# Patient Record
Sex: FEMALE | Race: WHITE | NOT HISPANIC OR LATINO | Employment: UNEMPLOYED | URBAN - METROPOLITAN AREA
[De-identification: names, ages, dates, MRNs, and addresses within clinical notes are randomized per-mention and may not be internally consistent; named-entity substitution may affect disease eponyms.]

---

## 2017-01-04 NOTE — PATIENT DISCUSSION
AMD (DRY), OU:  PRESCRIBE AREDS 2 VITAMINS / AMSLER GRID QD/ UV PROTECTION. SMOKING CESSATION EMPHASIZED. RETURN FOR FOLLOW-UP AS SCHEDULED.

## 2017-01-04 NOTE — PATIENT DISCUSSION
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   RETURN FOR FOLLOW-UP AS SCHEDULED FOR DILATED EYE EXAM.

## 2017-01-04 NOTE — PATIENT DISCUSSION
NONPROLIFERATIVE DIABETIC RETINOPATHY OS: NO EDEMA OR LARGE HEME'S. CONTINUE TO MONITOR WITH DILATED EYE EXAMS.

## 2017-05-04 NOTE — PATIENT DISCUSSION
EARLY MILD NON-EXUDATIVE DRY AMD OU:  NOT NECESSARY FOR PATIENT TO TAKE AREDS 2 VITAMINS AT THIS TIME. RECOMMEND HOME MONITORING OF VISION WITH AMSLER GRID AND USE OF UV PROTECTION. SMOKING AVOIDANCE REVIEWED. RETURN FOR FOLLOW-UP AS SCHEDULED.

## 2017-11-16 NOTE — PATIENT DISCUSSION
NONPROLIFERATIVE DIABETIC RETINOPATHY OS: STABLE - RETURN FOR FOLLOW-UP AS SCHEDULED FOR DILATED EYE EXAM.

## 2022-02-01 ENCOUNTER — NEW PATIENT (OUTPATIENT)
Dept: URBAN - METROPOLITAN AREA CLINIC 19 | Facility: CLINIC | Age: 42
End: 2022-02-01

## 2022-02-01 DIAGNOSIS — H18.593: ICD-10-CM

## 2022-02-01 DIAGNOSIS — H52.223: ICD-10-CM

## 2022-02-01 PROCEDURE — 92015 DETERMINE REFRACTIVE STATE: CPT

## 2022-02-01 PROCEDURE — 92004 COMPRE OPH EXAM NEW PT 1/>: CPT

## 2022-02-01 ASSESSMENT — KERATOMETRY
OD_AXISANGLE2_DEGREES: 173
OS_AXISANGLE_DEGREES: 67
OD_K1POWER_DIOPTERS: 44.50
OS_K1POWER_DIOPTERS: 44.50
OD_K2POWER_DIOPTERS: 45.50
OD_AXISANGLE_DEGREES: 83
OS_K2POWER_DIOPTERS: 45.50
OS_AXISANGLE2_DEGREES: 157

## 2022-02-01 ASSESSMENT — TONOMETRY
OS_IOP_MMHG: 18
OD_IOP_MMHG: 20

## 2022-02-01 ASSESSMENT — VISUAL ACUITY
OU_SC: 20/20
OD_SC: 20/40-1
OS_SC: 20/25-2

## 2022-04-19 NOTE — PROCEDURE NOTE: CLINICAL
PROCEDURE NOTE: Excision of Eyelid Lesion, Defect Size Small; Simple Closure or Cautery #1 Right Lower Lid. After informed consent the lesion was anesthetized with local anesthetic, 1% lidocane with epinephrine 1:100,000 units. Sterile technique was used to remove the lesion with Adair scissors. Antibiotic ointment was used to treat the area where lesion was removed. Lesion was sent to pathology for analysis. The patient was given written post operative wound care instructions and a prescription for antibiotic ointment. The patient was asked to call  within 10 days if they had not been otherwise called by our office with the result of the biopsy. Klaus Rueda

## 2024-02-21 ENCOUNTER — ESTABLISHED PATIENT (OUTPATIENT)
Dept: URBAN - METROPOLITAN AREA CLINIC 19 | Facility: CLINIC | Age: 44
End: 2024-02-21

## 2024-02-21 DIAGNOSIS — H52.223: ICD-10-CM

## 2024-02-21 PROCEDURE — 92014 COMPRE OPH EXAM EST PT 1/>: CPT

## 2024-02-21 PROCEDURE — 92015 DETERMINE REFRACTIVE STATE: CPT

## 2024-02-21 ASSESSMENT — VISUAL ACUITY
OD_CC: 20/25-2
OS_CC: 20/25-2
OU_CC: 20/25

## 2024-02-21 ASSESSMENT — TONOMETRY
OD_IOP_MMHG: 20
OS_IOP_MMHG: 20

## 2024-02-21 ASSESSMENT — KERATOMETRY
OS_AXISANGLE2_DEGREES: 63
OS_K1POWER_DIOPTERS: 45.75
OS_K2POWER_DIOPTERS: 44.75
OD_AXISANGLE2_DEGREES: 82
OS_AXISANGLE_DEGREES: 153
OD_K1POWER_DIOPTERS: 46
OD_AXISANGLE_DEGREES: 172
OD_K2POWER_DIOPTERS: 45

## 2025-02-25 ENCOUNTER — COMPREHENSIVE EXAM (OUTPATIENT)
Age: 45
End: 2025-02-25

## 2025-02-25 DIAGNOSIS — H43.393: ICD-10-CM

## 2025-02-25 DIAGNOSIS — H52.223: ICD-10-CM

## 2025-02-25 PROCEDURE — 92015 DETERMINE REFRACTIVE STATE: CPT

## 2025-02-25 PROCEDURE — 92014 COMPRE OPH EXAM EST PT 1/>: CPT
